# Patient Record
Sex: MALE | Race: WHITE | URBAN - METROPOLITAN AREA
[De-identification: names, ages, dates, MRNs, and addresses within clinical notes are randomized per-mention and may not be internally consistent; named-entity substitution may affect disease eponyms.]

---

## 2020-06-19 ENCOUNTER — OFFICE VISIT (OUTPATIENT)
Dept: URBAN - METROPOLITAN AREA TELEHEALTH 2 | Facility: TELEHEALTH | Age: 85
End: 2020-06-19
Payer: MEDICARE

## 2020-06-19 DIAGNOSIS — R10.13 EPIGASTRIC DISCOMFORT: ICD-10-CM

## 2020-06-19 DIAGNOSIS — N40.0 PROSTATE ENLARGEMENT: ICD-10-CM

## 2020-06-19 DIAGNOSIS — R10.32 LLQ ABDOMINAL PAIN: ICD-10-CM

## 2020-06-19 DIAGNOSIS — K21.9 GERD: ICD-10-CM

## 2020-06-19 PROCEDURE — 99204 OFFICE O/P NEW MOD 45 MIN: CPT | Performed by: INTERNAL MEDICINE

## 2020-06-19 PROCEDURE — G9903 PT SCRN TBCO ID AS NON USER: HCPCS | Performed by: INTERNAL MEDICINE

## 2020-06-19 PROCEDURE — 1036F TOBACCO NON-USER: CPT | Performed by: INTERNAL MEDICINE

## 2020-06-19 PROCEDURE — G8420 CALC BMI NORM PARAMETERS: HCPCS | Performed by: INTERNAL MEDICINE

## 2020-06-19 PROCEDURE — G8427 DOCREV CUR MEDS BY ELIG CLIN: HCPCS | Performed by: INTERNAL MEDICINE

## 2020-06-19 RX ORDER — ESOMEPRAZOLE MAGNESIUM 20 MG/1
1 CAPSULE CAPSULE, DELAYED RELEASE ORAL ONCE A DAY
Qty: 90 CAPSULE | Refills: 1 | OUTPATIENT

## 2020-06-19 NOTE — EXAM-PHYSICAL EXAM
VIRTUAL PHYSICAL EXAM   Comprehensive gastrointestinal virtual examination:  I asked the patient to "be my hands".  I guided the patient to palpate and apply pressure at all four abdominal quadrants.  In response to these maneuvers, patient reported the following:  - Negative response to abdominal pain - Abdomen is soft, non-tender, and non-distended  Constitutional - Ability to communicate:  Normal communication ability Chest - Respiratory effort:  Breathing sounds unlabored  Neurologic - Orientation:  Oriented to person, place, and time  Psychiatric:  Normal mood

## 2020-06-19 NOTE — HPI-OTHER HISTORIES
The patient reports LLQ which began in early May. The pain was 7/10 in intensity, stabbing, not radiating elsewhere. IIt became quite severe on 5/9/20 that he went to [facility]'s ER for further evaluation, where labs and a CT were done as detailed below. He denies any associated symptoms such as diarrhea, blood in the stools, anorexia, or unintentional weight loss. He does suffer from longstanding constipation. Despite this he does have a BM daily by taking Metamucil daily.  Upon further questioning he admits to an intermittent history of GERD  and mild epigastric discomfort for which he has been on Nexium 20 mg daily in the past, but ran out and hence he is not taking it currently. He would like a new prescription for such. He denies any nausea, vomiting or dysphagia.   The pain is much improved  now after seeing his Urologist who gave him Avodart, in addition to Flomax. It was felt that he had some component of obstructive uropathy secondary to mild prostate enlargement. He had his last colonoscopy while living in [place], 1 1/2 years ago, which was normal as per the patient. He denies a personal history of colon polyps. There is otherwise no FH of colon polyps or colon cancer.  Summary of prior workup: - Bloodwork on 5/9/20 revealed a sodium of 133, potassium 4.4, chloride 101, glucose 111, BUN 11, creatinine 1.10, TP 6.6, albumin 4.0, TB 0.9, ALT 20, AST 18, AP 51, lipase 10. WBC 7.8, hemoglobin 15.9, MCV 83, platelets 236. UA negative except for a few squamous epithelium cells.  - CT abdomen and pelvis with IV contrast on 5/9/20 revealed no acute abnormality within the abdomen or pelvis. No obstructive uropathy. 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 Brachytherapy seed placement within the prostate. GI tract decompressed with diverticulosis in the descending colon but no acute inflammatory changes. Normal mucosal enhancement. Appendix not identified. No acute abnormality within the bladder. Indeterminate 8 mm nodule in the left adrenal gland. Normal liver, bile ducts, spleen and pancreas. Clips at the gallbladder fossa indicating a prior cholecystectomy.

## 2020-06-23 ENCOUNTER — ERX REFILL RESPONSE (OUTPATIENT)
Age: 85
End: 2020-06-23

## 2020-06-23 RX ORDER — LANSOPRAZOLE 30 MG/1
PLEASE SPECIFY DIRECTIONS, REFILLS AND QUANTITY CAPSULE, DELAYED RELEASE ORAL
Qty: 1 CAPSULE | Refills: 0 | OUTPATIENT

## 2020-06-23 RX ORDER — ESOMEPRAZOLE MAGNESIUM 20 MG/1
TAKE 1 CAPSULE BY MOUTH EVERY DAY CAPSULE, DELAYED RELEASE PELLETS ORAL
Qty: 90 | Refills: 1 | OUTPATIENT

## 2020-06-27 ENCOUNTER — ERX REFILL RESPONSE (OUTPATIENT)
Age: 85
End: 2020-06-27

## 2020-06-27 RX ORDER — ESOMEPRAZOLE MAGNESIUM 20 MG/1
TAKE 1 CAPSULE BY MOUTH EVERY DAY CAPSULE, DELAYED RELEASE PELLETS ORAL
Qty: 90 | Refills: 1 | OUTPATIENT

## 2020-06-27 RX ORDER — LANSOPRAZOLE 30 MG/1
PLEASE SPECIFY DIRECTIONS, REFILLS AND QUANTITY CAPSULE, DELAYED RELEASE ORAL
Qty: 1 CAPSULE | Refills: 0 | OUTPATIENT

## 2021-03-08 ENCOUNTER — OFFICE VISIT (OUTPATIENT)
Dept: URBAN - METROPOLITAN AREA CLINIC 35 | Facility: CLINIC | Age: 86
End: 2021-03-08

## 2021-09-28 ENCOUNTER — WEB ENCOUNTER (OUTPATIENT)
Dept: URBAN - METROPOLITAN AREA CLINIC 23 | Facility: CLINIC | Age: 86
End: 2021-09-28

## 2021-09-28 ENCOUNTER — OFFICE VISIT (OUTPATIENT)
Dept: URBAN - METROPOLITAN AREA CLINIC 23 | Facility: CLINIC | Age: 86
End: 2021-09-28
Payer: MEDICARE

## 2021-09-28 DIAGNOSIS — R10.13 EPIGASTRIC ABDOMINAL PAIN: ICD-10-CM

## 2021-09-28 DIAGNOSIS — K21.9 GERD: ICD-10-CM

## 2021-09-28 DIAGNOSIS — J69.0 ASPIRATION PNEUMONIA OF RIGHT LUNG, UNSPECIFIED ASPIRATION PNEUMONIA TYPE, UNSPECIFIED PART OF LUNG: ICD-10-CM

## 2021-09-28 DIAGNOSIS — K44.9 HIATAL HERNIA: ICD-10-CM

## 2021-09-28 PROCEDURE — 99214 OFFICE O/P EST MOD 30 MIN: CPT | Performed by: INTERNAL MEDICINE

## 2021-09-28 RX ORDER — ESOMEPRAZOLE MAGNESIUM 20 MG/1
1 CAPSULE CAPSULE, DELAYED RELEASE ORAL ONCE A DAY
Qty: 90 CAPSULE | Refills: 1 | Status: ACTIVE | COMMUNITY

## 2021-09-28 RX ORDER — PANTOPRAZOLE SODIUM 40 MG/1
1 TABLET TABLET, DELAYED RELEASE ORAL BID
Qty: 60 | Refills: 2 | OUTPATIENT
Start: 2021-09-28

## 2021-09-28 RX ORDER — LANSOPRAZOLE 30 MG/1
PLEASE SPECIFY DIRECTIONS, REFILLS AND QUANTITY CAPSULE, DELAYED RELEASE ORAL
Qty: 1 CAPSULE | Refills: 0 | Status: ACTIVE | COMMUNITY

## 2021-09-28 NOTE — HPI-TODAY'S VISIT:
86-year-old male presented for evaluation of persistent epigastric abdominal pain the pain in the epigastric left upper quadrant area started about 3 months ago has progressed recently.  He was admitted to [facility] in September 15 2021 for shortness of breath was diagnosed with aspiration pneumonia.  Treated with IV antibiotic diuretic he felt better he had chronic reflux started on pantoprazole 40 mg twice a day he felt little better he reported the pain had improved.  He had chronic regurgitation reflux heartburn.  He has history of cholecystectomy.  His shortness of breath and epigastric pain improved since increasing the dose of pantoprazole.  He had upper endoscopy 4 years ago in [place] and was diagnosed with H. pylori and was treated.  He had colonoscopy about 2 years ago in [place] and was unremarkable.  No nausea vomiting only abdominal pain.  No weight loss    - CT abdomen and pelvis with IV contrast on 5/9/20 revealed no acute abnormality within the abdomen or pelvis. No obstructive uropathy. 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 Brachytherapy seed placement within the prostate. GI tract decompressed with diverticulosis in the descending colon but no acute inflammatory changes. Normal mucosal enhancement. Appendix not identified. No acute abnormality within the bladder. Indeterminate 8 mm nodule in the left adrenal gland. Normal liver, bile ducts, spleen and pancreas. Clips at the gallbladder fossa indicating a prior cholecystectomy.

## 2021-10-04 ENCOUNTER — TELEPHONE ENCOUNTER (OUTPATIENT)
Dept: URBAN - METROPOLITAN AREA CLINIC 40 | Facility: CLINIC | Age: 86
End: 2021-10-04

## 2021-10-06 ENCOUNTER — LAB OUTSIDE AN ENCOUNTER (OUTPATIENT)
Dept: URBAN - METROPOLITAN AREA CLINIC 23 | Facility: CLINIC | Age: 86
End: 2021-10-06

## 2021-10-13 ENCOUNTER — OFFICE VISIT (OUTPATIENT)
Dept: URBAN - METROPOLITAN AREA CLINIC 78 | Facility: CLINIC | Age: 86
End: 2021-10-13

## 2022-01-09 ENCOUNTER — ERX REFILL RESPONSE (OUTPATIENT)
Dept: URBAN - METROPOLITAN AREA CLINIC 23 | Facility: CLINIC | Age: 87
End: 2022-01-09

## 2022-01-09 RX ORDER — PANTOPRAZOLE SODIUM 40 MG/1
TAKE 1 TABLET ORALLY TWICE A DAY FOR 30 DAY(S) TABLET, DELAYED RELEASE ORAL
Qty: 60 TABLET | Refills: 3 | OUTPATIENT

## 2022-01-09 RX ORDER — PANTOPRAZOLE SODIUM 40 MG/1
1 TABLET TABLET, DELAYED RELEASE ORAL BID
Qty: 60 | Refills: 2 | OUTPATIENT

## 2022-04-08 ENCOUNTER — ERX REFILL RESPONSE (OUTPATIENT)
Dept: URBAN - METROPOLITAN AREA CLINIC 23 | Facility: CLINIC | Age: 87
End: 2022-04-08

## 2022-04-08 RX ORDER — PANTOPRAZOLE SODIUM 40 MG/1
TAKE 1 TABLET ORALLY TWICE A DAY FOR 30 DAY(S) TABLET, DELAYED RELEASE ORAL
Qty: 180 TABLET | Refills: 1 | OUTPATIENT

## 2022-04-08 RX ORDER — PANTOPRAZOLE SODIUM 40 MG/1
TAKE 1 TABLET ORALLY TWICE A DAY FOR 30 DAY(S) TABLET, DELAYED RELEASE ORAL
Qty: 60 TABLET | Refills: 3 | OUTPATIENT

## 2022-06-14 ENCOUNTER — ERX REFILL RESPONSE (OUTPATIENT)
Dept: URBAN - METROPOLITAN AREA CLINIC 23 | Facility: CLINIC | Age: 87
End: 2022-06-14

## 2022-06-14 RX ORDER — PANTOPRAZOLE SODIUM 40 MG/1
TAKE 1 TABLET ORALLY TWICE A DAY FOR 30 DAY(S) TABLET, DELAYED RELEASE ORAL
Qty: 180 TABLET | Refills: 1 | OUTPATIENT

## 2022-06-14 RX ORDER — PANTOPRAZOLE SODIUM 40 MG/1
TAKE 1 TABLET BY MOUTH TWICE DAILY FOR 90 DAYS TABLET, DELAYED RELEASE ORAL
Qty: 180 TABLET | Refills: 0 | OUTPATIENT

## 2022-09-22 ENCOUNTER — OFFICE VISIT (OUTPATIENT)
Dept: URBAN - METROPOLITAN AREA CLINIC 12 | Facility: CLINIC | Age: 87
End: 2022-09-22
Payer: MEDICARE

## 2022-09-22 VITALS
DIASTOLIC BLOOD PRESSURE: 66 MMHG | TEMPERATURE: 96.9 F | WEIGHT: 161 LBS | SYSTOLIC BLOOD PRESSURE: 118 MMHG | BODY MASS INDEX: 23.05 KG/M2 | HEIGHT: 70 IN | HEART RATE: 101 BPM

## 2022-09-22 DIAGNOSIS — K44.9 HIATAL HERNIA: ICD-10-CM

## 2022-09-22 DIAGNOSIS — K21.9 GERD: ICD-10-CM

## 2022-09-22 PROCEDURE — 99213 OFFICE O/P EST LOW 20 MIN: CPT | Performed by: INTERNAL MEDICINE

## 2022-09-22 RX ORDER — LANSOPRAZOLE 30 MG/1
PLEASE SPECIFY DIRECTIONS, REFILLS AND QUANTITY CAPSULE, DELAYED RELEASE ORAL
Qty: 1 CAPSULE | Refills: 0 | Status: ACTIVE | COMMUNITY

## 2022-09-22 RX ORDER — PANTOPRAZOLE SODIUM 40 MG/1
TAKE 1 TABLET BY MOUTH TWICE DAILY FOR 90 DAYS TABLET, DELAYED RELEASE ORAL
Qty: 180 TABLET | Refills: 0 | Status: ACTIVE | COMMUNITY

## 2022-09-22 RX ORDER — SUCRALFATE 1 G
1 TABLET ON AN EMPTY STOMACH TABLET ORAL TWICE A DAY
Qty: 28 TABLET | Refills: 0 | OUTPATIENT
Start: 2022-09-22 | End: 2022-10-06

## 2022-09-22 RX ORDER — ESOMEPRAZOLE MAGNESIUM 20 MG/1
1 CAPSULE CAPSULE, DELAYED RELEASE ORAL ONCE A DAY
Qty: 90 CAPSULE | Refills: 1 | Status: ACTIVE | COMMUNITY

## 2022-09-22 RX ORDER — FAMOTIDINE 20 MG/1
1 TABLET AT BEDTIME TABLET, FILM COATED ORAL ONCE A DAY
Qty: 30 | Refills: 2 | OUTPATIENT
Start: 2022-09-22

## 2022-09-22 NOTE — HPI-TODAY'S VISIT:
87-year-old male presented for belching bloating abdominal discomfort.  Patient had recent visit in [place] during the visit develop severe diarrhea he had colonoscopy there which was unremarkable he still have belching bloating abdominal discomfort.  Mostly after he eats.  Symptoms worse after meal associated with the bloating.  He had x-ray which was unremarkable.   He is on pantoprazole 40 mg a day with mild improvement  He had upper endoscopy 4 years ago in [place] and was diagnosed with H. pylori and was treated.   He had barium follow-up in October 2021 revealed small hiatal hernia and esophageal dysmotility but no significant reflux  - CT abdomen and pelvis with IV contrast on 5/9/20 revealed no acute abnormality within the abdomen or pelvis. No obstructive uropathy. 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 Brachytherapy seed placement within the prostate. GI tract decompressed with diverticulosis in the descending colon but no acute inflammatory changes. Normal mucosal enhancement. Appendix not identified. No acute abnormality within the bladder. Indeterminate 8 mm nodule in the left adrenal gland. Normal liver, bile ducts, spleen and pancreas. Clips at the gallbladder fossa indicating a prior cholecystectomy.

## 2022-10-02 ENCOUNTER — ERX REFILL RESPONSE (OUTPATIENT)
Dept: URBAN - METROPOLITAN AREA CLINIC 23 | Facility: CLINIC | Age: 87
End: 2022-10-02

## 2022-10-02 RX ORDER — PANTOPRAZOLE SODIUM 40 MG/1
TAKE 1 TABLET BY MOUTH TWICE DAILY FOR 90 DAYS TABLET, DELAYED RELEASE ORAL
Qty: 180 TABLET | Refills: 0 | OUTPATIENT

## 2022-10-02 RX ORDER — PANTOPRAZOLE SODIUM 40 MG/1
TAKE 1 TABLET BY MOUTH TWICE DAILY TABLET, DELAYED RELEASE ORAL
Qty: 180 TABLET | Refills: 0 | OUTPATIENT

## 2022-11-13 ENCOUNTER — ERX REFILL RESPONSE (OUTPATIENT)
Dept: URBAN - METROPOLITAN AREA CLINIC 12 | Facility: CLINIC | Age: 87
End: 2022-11-13

## 2022-11-13 RX ORDER — FAMOTIDINE 20 MG/1
TAKE 1 TABLET BY MOUTH EVERY DAY AT BEDTIME FOR 30 DAYS TABLET, FILM COATED ORAL
Qty: 90 TABLET | Refills: 1 | OUTPATIENT

## 2022-11-13 RX ORDER — FAMOTIDINE 20 MG/1
1 TABLET AT BEDTIME TABLET, FILM COATED ORAL ONCE A DAY
Qty: 30 | Refills: 2 | OUTPATIENT

## 2023-01-03 ENCOUNTER — ERX REFILL RESPONSE (OUTPATIENT)
Dept: URBAN - METROPOLITAN AREA CLINIC 23 | Facility: CLINIC | Age: 88
End: 2023-01-03

## 2023-01-03 RX ORDER — PANTOPRAZOLE SODIUM 40 MG/1
TAKE 1 TABLET BY MOUTH TWICE A DAY TABLET, DELAYED RELEASE ORAL
Qty: 180 TABLET | Refills: 0 | OUTPATIENT

## 2023-03-09 ENCOUNTER — ERX REFILL RESPONSE (OUTPATIENT)
Dept: URBAN - METROPOLITAN AREA CLINIC 23 | Facility: CLINIC | Age: 88
End: 2023-03-09

## 2023-03-09 RX ORDER — PANTOPRAZOLE SODIUM 40 MG/1
TAKE 1 TABLET BY MOUTH TWICE A DAY TABLET, DELAYED RELEASE ORAL
Qty: 180 TABLET | Refills: 0 | OUTPATIENT

## 2024-01-08 ENCOUNTER — TELEPHONE ENCOUNTER (OUTPATIENT)
Dept: URBAN - METROPOLITAN AREA CLINIC 23 | Facility: CLINIC | Age: 89
End: 2024-01-08

## 2024-01-11 ENCOUNTER — DASHBOARD ENCOUNTERS (OUTPATIENT)
Age: 89
End: 2024-01-11

## 2024-01-11 ENCOUNTER — OFFICE VISIT (OUTPATIENT)
Dept: URBAN - METROPOLITAN AREA CLINIC 12 | Facility: CLINIC | Age: 89
End: 2024-01-11
Payer: MEDICARE

## 2024-01-11 VITALS
WEIGHT: 157 LBS | DIASTOLIC BLOOD PRESSURE: 72 MMHG | BODY MASS INDEX: 22.48 KG/M2 | HEIGHT: 70 IN | HEART RATE: 101 BPM | SYSTOLIC BLOOD PRESSURE: 154 MMHG | TEMPERATURE: 97.7 F

## 2024-01-11 DIAGNOSIS — K21.9 CHRONIC GERD: ICD-10-CM

## 2024-01-11 DIAGNOSIS — Z87.19 HISTORY OF RECTAL ABSCESS: ICD-10-CM

## 2024-01-11 DIAGNOSIS — K59.01 CONSTIPATION: ICD-10-CM

## 2024-01-11 DIAGNOSIS — R11.0 NAUSEA: ICD-10-CM

## 2024-01-11 PROCEDURE — 99213 OFFICE O/P EST LOW 20 MIN: CPT | Performed by: INTERNAL MEDICINE

## 2024-01-11 RX ORDER — LANSOPRAZOLE 30 MG/1
PLEASE SPECIFY DIRECTIONS, REFILLS AND QUANTITY CAPSULE, DELAYED RELEASE ORAL
Qty: 1 CAPSULE | Refills: 0 | Status: ON HOLD | COMMUNITY

## 2024-01-11 RX ORDER — ESOMEPRAZOLE MAGNESIUM 20 MG/1
1 CAPSULE CAPSULE, DELAYED RELEASE ORAL ONCE A DAY
Qty: 90 CAPSULE | Refills: 1 | Status: ON HOLD | COMMUNITY

## 2024-01-11 RX ORDER — PANTOPRAZOLE SODIUM 40 MG/1
TAKE 1 TABLET BY MOUTH TWICE A DAY TABLET, DELAYED RELEASE ORAL
Qty: 180 TABLET | Refills: 0 | Status: ACTIVE | COMMUNITY

## 2024-01-11 RX ORDER — SUCRALFATE 1 G/10ML
TAKE 10 MILLILITER TWICE A DAY SUSPENSION ORAL TWICE A DAY
Qty: 200 | Refills: 0 | OUTPATIENT
Start: 2024-01-11 | End: 2024-01-21

## 2024-01-11 RX ORDER — FAMOTIDINE 20 MG/1
TAKE 1 TABLET BY MOUTH EVERY DAY AT BEDTIME FOR 30 DAYS TABLET, FILM COATED ORAL
Qty: 90 TABLET | Refills: 1 | Status: ON HOLD | COMMUNITY

## 2024-01-11 RX ORDER — ONDANSETRON 4 MG/1
1 TABLET ON THE TONGUE AND ALLOW TO DISSOLVE TABLET, ORALLY DISINTEGRATING ORAL EVERY 6 HOURS
Qty: 120 TABLET | Refills: 3 | OUTPATIENT
Start: 2024-01-11

## 2024-01-11 NOTE — HPI-TODAY'S VISIT:
88 year-old male presented for follow-up after hospitalization at [facility] in September 2023 for perianal abscess complicated with fistula he required 2 surgeries 1 in September to drain the abscess and another 1 for fistulotomy and seton placement in November.  He had episode of fainting was hospitalized at [facility] had CT cardiac workup which was unremarkable.  Patient reported he had severe constipation first in September complicated with fissure and abscess which required surgical drain and hospitalization with IV antibiotic.  He is here today to discuss treatment to prevent recurrent abscess.  He had occasional constipation bloating his CT at [facility] showed significant constipation and was giving Dulcolax MiraLAX Senokot which helped him but still has constipation with 1 bowel movement every 2 days.  He also complaining of nausea no vomiting.  He had multiple imaging studies which all unremarkable except for increased stool burden in the colon.  For his reflux using pantoprazole once a day He had history of A-fib he is on Xarelto

## 2024-01-12 PROBLEM — 235595009: Status: ACTIVE | Noted: 2024-01-12

## 2024-03-21 ENCOUNTER — OV EP (OUTPATIENT)
Dept: URBAN - METROPOLITAN AREA CLINIC 12 | Facility: CLINIC | Age: 89
End: 2024-03-21